# Patient Record
Sex: FEMALE | Race: BLACK OR AFRICAN AMERICAN | Employment: OTHER | URBAN - METROPOLITAN AREA
[De-identification: names, ages, dates, MRNs, and addresses within clinical notes are randomized per-mention and may not be internally consistent; named-entity substitution may affect disease eponyms.]

---

## 2023-06-30 ENCOUNTER — HOSPITAL ENCOUNTER (EMERGENCY)
Facility: HOSPITAL | Age: 78
Discharge: HOME | End: 2023-06-30
Attending: EMERGENCY MEDICINE
Payer: COMMERCIAL

## 2023-06-30 ENCOUNTER — APPOINTMENT (OUTPATIENT)
Dept: CT IMAGING | Facility: HOSPITAL | Age: 78
End: 2023-06-30
Attending: EMERGENCY MEDICINE
Payer: COMMERCIAL

## 2023-06-30 VITALS
HEIGHT: 68 IN | DIASTOLIC BLOOD PRESSURE: 87 MMHG | RESPIRATION RATE: 18 BRPM | HEART RATE: 67 BPM | OXYGEN SATURATION: 98 % | BODY MASS INDEX: 24.25 KG/M2 | SYSTOLIC BLOOD PRESSURE: 136 MMHG | TEMPERATURE: 97 F | WEIGHT: 160 LBS

## 2023-06-30 DIAGNOSIS — S09.90XA INJURY OF HEAD, INITIAL ENCOUNTER: ICD-10-CM

## 2023-06-30 DIAGNOSIS — R55 SYNCOPE AND COLLAPSE: Primary | ICD-10-CM

## 2023-06-30 LAB
ANION GAP SERPL CALC-SCNC: 8 MMOL/L (ref 0–18)
BASOPHILS # BLD AUTO: 0.05 X10(3) UL (ref 0–0.2)
BASOPHILS NFR BLD AUTO: 0.8 %
BUN BLD-MCNC: 24 MG/DL (ref 7–18)
BUN/CREAT SERPL: 22.4 (ref 10–20)
CALCIUM BLD-MCNC: 9.2 MG/DL (ref 8.5–10.1)
CHLORIDE SERPL-SCNC: 110 MMOL/L (ref 98–112)
CO2 SERPL-SCNC: 27 MMOL/L (ref 21–32)
CREAT BLD-MCNC: 1.07 MG/DL
DEPRECATED RDW RBC AUTO: 47.7 FL (ref 35.1–46.3)
EOSINOPHIL # BLD AUTO: 0.12 X10(3) UL (ref 0–0.7)
EOSINOPHIL NFR BLD AUTO: 2 %
ERYTHROCYTE [DISTWIDTH] IN BLOOD BY AUTOMATED COUNT: 15 % (ref 11–15)
GFR SERPLBLD BASED ON 1.73 SQ M-ARVRAT: 53 ML/MIN/1.73M2 (ref 60–?)
GLUCOSE BLD-MCNC: 104 MG/DL (ref 70–99)
HCT VFR BLD AUTO: 41.2 %
HGB BLD-MCNC: 12.9 G/DL
IMM GRANULOCYTES # BLD AUTO: 0.01 X10(3) UL (ref 0–1)
IMM GRANULOCYTES NFR BLD: 0.2 %
LYMPHOCYTES # BLD AUTO: 1.24 X10(3) UL (ref 1–4)
LYMPHOCYTES NFR BLD AUTO: 20.7 %
MCH RBC QN AUTO: 27.2 PG (ref 26–34)
MCHC RBC AUTO-ENTMCNC: 31.3 G/DL (ref 31–37)
MCV RBC AUTO: 86.7 FL
MONOCYTES # BLD AUTO: 0.48 X10(3) UL (ref 0.1–1)
MONOCYTES NFR BLD AUTO: 8 %
NEUTROPHILS # BLD AUTO: 4.1 X10 (3) UL (ref 1.5–7.7)
NEUTROPHILS # BLD AUTO: 4.1 X10(3) UL (ref 1.5–7.7)
NEUTROPHILS NFR BLD AUTO: 68.3 %
OSMOLALITY SERPL CALC.SUM OF ELEC: 304 MOSM/KG (ref 275–295)
PLATELET # BLD AUTO: 184 10(3)UL (ref 150–450)
POTASSIUM SERPL-SCNC: 3.8 MMOL/L (ref 3.5–5.1)
RBC # BLD AUTO: 4.75 X10(6)UL
SODIUM SERPL-SCNC: 145 MMOL/L (ref 136–145)
TROPONIN I HIGH SENSITIVITY: 9 NG/L
WBC # BLD AUTO: 6 X10(3) UL (ref 4–11)

## 2023-06-30 PROCEDURE — 85025 COMPLETE CBC W/AUTO DIFF WBC: CPT | Performed by: EMERGENCY MEDICINE

## 2023-06-30 PROCEDURE — 93010 ELECTROCARDIOGRAM REPORT: CPT

## 2023-06-30 PROCEDURE — 70450 CT HEAD/BRAIN W/O DYE: CPT | Performed by: EMERGENCY MEDICINE

## 2023-06-30 PROCEDURE — 93005 ELECTROCARDIOGRAM TRACING: CPT

## 2023-06-30 PROCEDURE — 99284 EMERGENCY DEPT VISIT MOD MDM: CPT

## 2023-06-30 PROCEDURE — 99285 EMERGENCY DEPT VISIT HI MDM: CPT

## 2023-06-30 PROCEDURE — 36415 COLL VENOUS BLD VENIPUNCTURE: CPT

## 2023-06-30 PROCEDURE — 80048 BASIC METABOLIC PNL TOTAL CA: CPT | Performed by: EMERGENCY MEDICINE

## 2023-06-30 PROCEDURE — 84484 ASSAY OF TROPONIN QUANT: CPT | Performed by: EMERGENCY MEDICINE

## 2023-07-01 LAB
ATRIAL RATE: 60 BPM
P AXIS: 66 DEGREES
P-R INTERVAL: 200 MS
Q-T INTERVAL: 438 MS
QRS DURATION: 90 MS
QTC CALCULATION (BEZET): 438 MS
R AXIS: 22 DEGREES
T AXIS: 13 DEGREES
VENTRICULAR RATE: 60 BPM

## 2023-07-01 NOTE — ED QUICK NOTES
Patient to ER for concerns of syncopal episode that occurred today around 1830. Patient reports she was at a hotel, where she began to feel dizzy and states she tried to catch herself with some boxes but could not and fell on hard floor. + head injury, denies blood thinners. Patient reports frontal headache. Patient is a/o x 4, no injury noted.

## 2023-07-01 NOTE — ED INITIAL ASSESSMENT (HPI)
Patient arrives via [facility] following a syncopal episode at her hotel within the last hour. Patient reports she did fall and hit her head. Patient doesn't remember the events prior.